# Patient Record
Sex: MALE | Race: WHITE | NOT HISPANIC OR LATINO | Employment: FULL TIME | URBAN - METROPOLITAN AREA
[De-identification: names, ages, dates, MRNs, and addresses within clinical notes are randomized per-mention and may not be internally consistent; named-entity substitution may affect disease eponyms.]

---
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
## 2025-02-05 ENCOUNTER — OFFICE VISIT (OUTPATIENT)
Dept: FAMILY MEDICINE | Facility: CLINIC | Age: 20
End: 2025-02-05
Payer: COMMERCIAL

## 2025-02-05 VITALS
TEMPERATURE: 98.1 F | HEART RATE: 78 BPM | DIASTOLIC BLOOD PRESSURE: 74 MMHG | BODY MASS INDEX: 21.17 KG/M2 | HEIGHT: 74 IN | SYSTOLIC BLOOD PRESSURE: 102 MMHG | WEIGHT: 165 LBS | OXYGEN SATURATION: 100 %

## 2025-02-05 DIAGNOSIS — F33.1 MAJOR DEPRESSIVE DISORDER, RECURRENT EPISODE, MODERATE (H): Primary | ICD-10-CM

## 2025-02-05 DIAGNOSIS — F41.1 GENERALIZED ANXIETY DISORDER: ICD-10-CM

## 2025-02-05 PROCEDURE — 99214 OFFICE O/P EST MOD 30 MIN: CPT | Performed by: PHYSICIAN ASSISTANT

## 2025-02-05 PROCEDURE — 96127 BRIEF EMOTIONAL/BEHAV ASSMT: CPT | Performed by: PHYSICIAN ASSISTANT

## 2025-02-05 PROCEDURE — G2211 COMPLEX E/M VISIT ADD ON: HCPCS | Performed by: PHYSICIAN ASSISTANT

## 2025-02-05 RX ORDER — ESCITALOPRAM OXALATE 10 MG/1
10 TABLET ORAL DAILY
Qty: 30 TABLET | Refills: 1 | Status: SHIPPED | OUTPATIENT
Start: 2025-02-05

## 2025-02-05 RX ORDER — HYDROXYZINE HYDROCHLORIDE 25 MG/1
25 TABLET, FILM COATED ORAL 3 TIMES DAILY PRN
Qty: 60 TABLET | Refills: 0 | Status: SHIPPED | OUTPATIENT
Start: 2025-02-05

## 2025-02-05 ASSESSMENT — ENCOUNTER SYMPTOMS: NERVOUS/ANXIOUS: 1

## 2025-02-05 ASSESSMENT — ANXIETY QUESTIONNAIRES
1. FEELING NERVOUS, ANXIOUS, OR ON EDGE: MORE THAN HALF THE DAYS
8. IF YOU CHECKED OFF ANY PROBLEMS, HOW DIFFICULT HAVE THESE MADE IT FOR YOU TO DO YOUR WORK, TAKE CARE OF THINGS AT HOME, OR GET ALONG WITH OTHER PEOPLE?: SOMEWHAT DIFFICULT
3. WORRYING TOO MUCH ABOUT DIFFERENT THINGS: MORE THAN HALF THE DAYS
6. BECOMING EASILY ANNOYED OR IRRITABLE: NEARLY EVERY DAY
5. BEING SO RESTLESS THAT IT IS HARD TO SIT STILL: MORE THAN HALF THE DAYS
4. TROUBLE RELAXING: MORE THAN HALF THE DAYS
7. FEELING AFRAID AS IF SOMETHING AWFUL MIGHT HAPPEN: SEVERAL DAYS
GAD7 TOTAL SCORE: 13
7. FEELING AFRAID AS IF SOMETHING AWFUL MIGHT HAPPEN: SEVERAL DAYS
2. NOT BEING ABLE TO STOP OR CONTROL WORRYING: SEVERAL DAYS
GAD7 TOTAL SCORE: 13
IF YOU CHECKED OFF ANY PROBLEMS ON THIS QUESTIONNAIRE, HOW DIFFICULT HAVE THESE PROBLEMS MADE IT FOR YOU TO DO YOUR WORK, TAKE CARE OF THINGS AT HOME, OR GET ALONG WITH OTHER PEOPLE: SOMEWHAT DIFFICULT
GAD7 TOTAL SCORE: 13

## 2025-02-05 ASSESSMENT — PAIN SCALES - GENERAL: PAINLEVEL_OUTOF10: NO PAIN (0)

## 2025-02-05 ASSESSMENT — PATIENT HEALTH QUESTIONNAIRE - PHQ9: SUM OF ALL RESPONSES TO PHQ QUESTIONS 1-9: 14

## 2025-02-05 NOTE — PATIENT INSTRUCTIONS
Start lexapro daily for depression and anxiety.     Use Hydroxyzine as needed for panic attacks and sleep.     Follow-up with me in 4-6 weeks for recheck.

## 2025-02-05 NOTE — PROGRESS NOTES
Assessment & Plan   Major depressive disorder, recurrent episode, moderate (H)  Generalized anxiety disorder  Patient presents for worsening anxiety and depression. Hx of depression, anxiety, ADHD and ODD in the past. He has not received treatment for depression and anxiety in the past. He reports difficulty sleeping, eating, lack of energy and interest, however denies suicidal ideation or self harm. He experienced symptoms of anxiety including palpitations, stomach cramping, and difficulty concentrating. DILAN-7 reveals moderate anxiety. PHQ9 reveals moderate depression. Given the patient's history, I suspect depression is the greater cause of his symptoms today. Offered a referral to behavioral health, which he deferred at this time. Recommended starting escitalopram daily with as needed hydroxyzine for panic attacks and difficulty sleeping. Consider treating ADHD in the future as well, but this was deferred by patients and MOC today. Follow-up in 4-6 weeks for recheck.   - escitalopram (LEXAPRO) 10 MG tablet; Take 1 tablet (10 mg) by mouth daily.  - hydrOXYzine HCl (ATARAX) 25 MG tablet; Take 1 tablet (25 mg) by mouth 3 times daily as needed for anxiety.    Depression Screening Follow Up      2/5/2025     8:34 AM   PHQ   PHQ-9 Total Score 14   Q9: Thoughts of better off dead/self-harm past 2 weeks Not at all         2/5/2025     8:34 AM   Last PHQ-9   1.  Little interest or pleasure in doing things 1   2.  Feeling down, depressed, or hopeless 2   3.  Trouble falling or staying asleep, or sleeping too much 2   4.  Feeling tired or having little energy 3   5.  Poor appetite or overeating 3   6.  Feeling bad about yourself 1   7.  Trouble concentrating 2   8.  Moving slowly or restless 0   Q9: Thoughts of better off dead/self-harm past 2 weeks 0   PHQ-9 Total Score 14   Difficulty at work, home, or with people Somewhat difficult     Follow Up Actions Taken  Crisis resource information provided in After Visit Summary        FUTURE APPOINTMENTS:       - Follow-up E-visit in 4-6 weeks.     Romeo Grimes is a 19 year old, presenting for the following health issues:  Depression and Anxiety        2/5/2025     7:52 AM   Additional Questions   Roomed by Rose Carrasco CMA   Accompanied by Mother     History of Present Illness       Mental Health Follow-up:  Patient presents to follow-up on Depression & Anxiety.Patient's depression since last visit has been:  Medium  The patient is having other symptoms associated with depression.  Patient's anxiety since last visit has been:  Bad  The patient is having other symptoms associated with anxiety.  Any significant life events: relationship concerns, job concerns and financial concerns  Patient is feeling anxious or having panic attacks.  Patient has no concerns about alcohol or drug use.   He is taking medications regularly.     Depression and Anxiety   How are you doing with your depression since your last visit? New sxs   How are you doing with your anxiety since your last visit?  Situational pt would not discuss   Are you having other symptoms that might be associated with depression or anxiety? Yes:  tightness in chest , stomach issues , not eating   Have you had a significant life event? OTHER: Pt would not discuss with roomer     Do you have any concerns with your use of alcohol or other drugs? No    Social History     Tobacco Use    Smoking status: Never    Smokeless tobacco: Never    Tobacco comments:     No exposure at home    Vaping Use    Vaping status: Never Used   Substance Use Topics    Alcohol use: No    Drug use: No         2/5/2025     8:34 AM   PHQ   PHQ-9 Total Score 14   Q9: Thoughts of better off dead/self-harm past 2 weeks Not at all         2/9/2021     8:10 AM 2/5/2025     7:45 AM   DILAN-7 SCORE   Total Score  13 (moderate anxiety)   Total Score 6 13        Patient-reported         2/5/2025     8:34 AM   Last PHQ-9   1.  Little interest or pleasure in doing things  "1   2.  Feeling down, depressed, or hopeless 2   3.  Trouble falling or staying asleep, or sleeping too much 2   4.  Feeling tired or having little energy 3   5.  Poor appetite or overeating 3   6.  Feeling bad about yourself 1   7.  Trouble concentrating 2   8.  Moving slowly or restless 0   Q9: Thoughts of better off dead/self-harm past 2 weeks 0   PHQ-9 Total Score 14   Difficulty at work, home, or with people Somewhat difficult         2/5/2025     7:45 AM   DILAN-7    1. Feeling nervous, anxious, or on edge 2   2. Not being able to stop or control worrying 1   3. Worrying too much about different things 2   4. Trouble relaxing 2   5. Being so restless that it is hard to sit still 2   6. Becoming easily annoyed or irritable 3   7. Feeling afraid, as if something awful might happen 1   DILAN-7 Total Score 13    If you checked any problems, how difficult have they made it for you to do your work, take care of things at home, or get along with other people? Somewhat difficult       Patient-reported       Suicide Assessment Five-step Evaluation and Treatment (SAFE-T)  {  Review of Systems  See HPI       Objective    /74 (BP Location: Right arm, Patient Position: Sitting, Cuff Size: Adult Regular)   Pulse 78   Temp 98.1  F (36.7  C) (Tympanic)   Ht 1.872 m (6' 1.7\")   Wt 74.8 kg (165 lb)   SpO2 100%   BMI 21.36 kg/m    Body mass index is 21.36 kg/m .  Physical Exam   Constitutional: healthy, alert, and no distress  Head: Normocephalic. Atraumatic  Eyes: No conjunctival injection, sclera anicteric  Respiratory: No resp distress.  Musculoskeletal: extremities normal- no gross deformities noted, and normal muscle tone  Neurologic: Gait normal. CN 2-12 grossly intact  Psychiatric: mentation appears normal and affect normal/bright       Physician Attestation   I, Kareem Lundy PA-C, was present with the medical/GUERO student who participated in the service and in the documentation of the note.  I have verified " the history and personally performed the physical exam and medical decision making.  I agree with the assessment and plan of care as documented in the note.      Kareem Lundy PA-C

## 2025-05-29 ENCOUNTER — HOSPITAL ENCOUNTER (EMERGENCY)
Facility: CLINIC | Age: 20
Discharge: HOME | End: 2025-05-29
Payer: COMMERCIAL

## 2025-05-29 VITALS
DIASTOLIC BLOOD PRESSURE: 61 MMHG | TEMPERATURE: 100.6 F | HEART RATE: 78 BPM | SYSTOLIC BLOOD PRESSURE: 113 MMHG | BODY MASS INDEX: 21.67 KG/M2 | WEIGHT: 160 LBS | HEIGHT: 72 IN | RESPIRATION RATE: 18 BRPM | OXYGEN SATURATION: 97 %

## 2025-05-29 DIAGNOSIS — J02.9 PHARYNGITIS: ICD-10-CM

## 2025-05-29 DIAGNOSIS — R53.83 FATIGUE: ICD-10-CM

## 2025-05-29 DIAGNOSIS — R50.9 FEVER: ICD-10-CM

## 2025-05-29 DIAGNOSIS — M79.10 MYALGIA: ICD-10-CM

## 2025-05-29 LAB
FLUAV RNA SPEC QL NAA+PROBE: NEGATIVE
FLUBV RNA RESP QL NAA+PROBE: NEGATIVE
MONOCYTES NFR BLD AUTO: NEGATIVE %
RSV RNA SPEC NAA+PROBE: NEGATIVE
S PYO DNA THROAT QL NAA+PROBE: NOT DETECTED
SARS-COV-2 RNA RESP QL NAA+PROBE: NEGATIVE

## 2025-05-29 PROCEDURE — 99284 EMERGENCY DEPT VISIT MOD MDM: CPT

## 2025-05-29 PROCEDURE — 87651 STREP A DNA AMP PROBE: CPT

## 2025-05-29 PROCEDURE — 87637 SARSCOV2&INF A&B&RSV AMP PRB: CPT

## 2025-05-29 PROCEDURE — 86308 HETEROPHILE ANTIBODY SCREEN: CPT

## 2025-05-29 PROCEDURE — 250N000009 HC RX 250

## 2025-05-29 PROCEDURE — 250N000013 HC RX MED GY IP 250 OP 250 PS 637: Performed by: EMERGENCY MEDICINE

## 2025-05-29 PROCEDURE — 36415 COLL VENOUS BLD VENIPUNCTURE: CPT

## 2025-05-29 PROCEDURE — 250N000011 HC RX IP 250 OP 636: Performed by: EMERGENCY MEDICINE

## 2025-05-29 PROCEDURE — 99283 EMERGENCY DEPT VISIT LOW MDM: CPT

## 2025-05-29 RX ORDER — ONDANSETRON 4 MG/1
4 TABLET, ORALLY DISINTEGRATING ORAL ONCE
Status: COMPLETED | OUTPATIENT
Start: 2025-05-29 | End: 2025-05-29

## 2025-05-29 RX ORDER — ACETAMINOPHEN 500 MG
1000 TABLET ORAL ONCE
Status: COMPLETED | OUTPATIENT
Start: 2025-05-29 | End: 2025-05-29

## 2025-05-29 RX ORDER — DEXAMETHASONE SODIUM PHOSPHATE 4 MG/ML
10 VIAL (ML) INJECTION ONCE
Status: COMPLETED | OUTPATIENT
Start: 2025-05-29 | End: 2025-05-29

## 2025-05-29 RX ORDER — ONDANSETRON 4 MG/1
4 TABLET, ORALLY DISINTEGRATING ORAL EVERY 8 HOURS PRN
Qty: 10 TABLET | Refills: 0 | Status: SHIPPED | OUTPATIENT
Start: 2025-05-29

## 2025-05-29 RX ADMIN — DEXAMETHASONE SODIUM PHOSPHATE 10 MG: 4 INJECTION, SOLUTION INTRAMUSCULAR; INTRAVENOUS at 12:10

## 2025-05-29 RX ADMIN — ACETAMINOPHEN 1000 MG: 500 TABLET, FILM COATED ORAL at 10:11

## 2025-05-29 RX ADMIN — ONDANSETRON 4 MG: 4 TABLET, ORALLY DISINTEGRATING ORAL at 10:11

## 2025-05-29 ASSESSMENT — COLUMBIA-SUICIDE SEVERITY RATING SCALE - C-SSRS
2. HAVE YOU ACTUALLY HAD ANY THOUGHTS OF KILLING YOURSELF IN THE PAST MONTH?: NO
6. HAVE YOU EVER DONE ANYTHING, STARTED TO DO ANYTHING, OR PREPARED TO DO ANYTHING TO END YOUR LIFE?: NO
1. IN THE PAST MONTH, HAVE YOU WISHED YOU WERE DEAD OR WISHED YOU COULD GO TO SLEEP AND NOT WAKE UP?: NO

## 2025-05-29 ASSESSMENT — ACTIVITIES OF DAILY LIVING (ADL)
ADLS_ACUITY_SCORE: 41

## 2025-05-29 NOTE — ED PROVIDER NOTES
History     Chief Complaint   Patient presents with    Flu Symptoms       Cornelio Desouza is a 19 year old male with significant pmhx of ADHD, oppositional defiant disorder, anxiety who presents for evaluation of flu-like symptoms. Patient developed n/v, body aches, fever, and sore throat on Tuesday afternoon, and symptoms have persisted.  His fever started on Tuesday night and has been as high as 103.  He has been taking DayQuil, NyQuil, and ibuprofen.  No nausea or vomiting since taking ibuprofen this morning at 830.  He denies congestion, runny nose, neck stiffness/pain, ear pain, difficulty swallowing, difficulty breathing, cough, urinary or stool changes    Allergies:  Allergies   Allergen Reactions    Bees        Problem List:    Patient Active Problem List    Diagnosis Date Noted    Anxiety 02/16/2021     Priority: Medium    Toxic effect of venom of bees 02/09/2021     Priority: Medium    ADHD (attention deficit hyperactivity disorder), combined type 05/06/2015     Priority: Medium    Oppositional defiant disorder 05/06/2015     Priority: Medium        Past Medical History:    No past medical history on file.    Past Surgical History:    No past surgical history on file.    Family History:    No family history on file.    Social History:  Marital Status:  Single [1]  Social History     Tobacco Use    Smoking status: Never    Smokeless tobacco: Never    Tobacco comments:     No exposure at home    Vaping Use    Vaping status: Never Used   Substance Use Topics    Alcohol use: No    Drug use: No        Medications:    ondansetron (ZOFRAN ODT) 4 MG ODT tab  escitalopram (LEXAPRO) 10 MG tablet  hydrOXYzine HCl (ATARAX) 25 MG tablet          Physical Exam   BP: 113/61  Pulse: 78  Temp: 100.6  F (38.1  C)  Resp: 18  Height: 182.9 cm (6')  Weight: 72.6 kg (160 lb)  SpO2: 97 %      Physical Exam  Vitals and nursing note reviewed.   Constitutional:       General: He is not in acute distress.     Appearance: He is  ill-appearing. He is not toxic-appearing or diaphoretic.   HENT:      Head: Normocephalic and atraumatic.      Right Ear: Tympanic membrane normal.      Left Ear: Tympanic membrane normal.      Nose: Nose normal.      Mouth/Throat:      Mouth: Mucous membranes are moist.      Pharynx: Uvula midline. Posterior oropharyngeal erythema present. No uvula swelling.      Tonsils: Tonsillar exudate present. No tonsillar abscesses. 2+ on the right. 2+ on the left.   Eyes:      Extraocular Movements: Extraocular movements intact.      Conjunctiva/sclera: Conjunctivae normal.      Pupils: Pupils are equal, round, and reactive to light.   Cardiovascular:      Rate and Rhythm: Normal rate and regular rhythm.      Heart sounds: Normal heart sounds.   Pulmonary:      Effort: Pulmonary effort is normal.      Breath sounds: Normal breath sounds.   Musculoskeletal:         General: Normal range of motion.      Cervical back: Normal range of motion. No rigidity.   Lymphadenopathy:      Cervical: Cervical adenopathy present.   Skin:     General: Skin is warm.   Neurological:      General: No focal deficit present.      Mental Status: He is alert and oriented to person, place, and time.   Psychiatric:         Mood and Affect: Mood normal.         Behavior: Behavior normal.         ED Course        Procedures                    Results for orders placed or performed during the hospital encounter of 05/29/25 (from the past 24 hours)   Influenza A/B, RSV and SARS-CoV2 PCR (COVID-19) Nose    Specimen: Nose; Swab   Result Value Ref Range    Influenza A PCR Negative Negative    Influenza B PCR Negative Negative    RSV PCR Negative Negative    SARS CoV2 PCR Negative Negative    Narrative    Testing was performed using the Xpert Xpress CoV2/Flu/RSV Assay on the [employer] GeneXpert Instrument. This test should be ordered for the detection of SARS-CoV2, influenza, and RSV viruses in individuals with signs and symptoms of respiratory tract  infection. This test is for in vitro diagnostic use under the [place] FDA for laboratories certified under CLIA to perform high or moderate complexity testing. This test has been [place] FDA cleared. A negative result does not rule out the presence of PCR inhibitors in the specimen or target RNA in concentration below the limit of detection for the assay. If only one viral target is positive but coinfection with multiple targets is suspected, the sample should be re-tested with another FDA cleared, approved, or authorized test, if coninfection would change clinical management. This test was validated by the [facility]. These laboratories are certified under the Clinical Laboratory Improvement Amendments of 1988 (CLIA-88) as qualified to perfom high complexity laboratory testing.   Group A Streptococcus PCR Throat Swab    Specimen: Throat; Swab   Result Value Ref Range    Group A strep by PCR Not Detected Not Detected    Narrative    The Xpert Xpress Strep A test, performed on the [employer], is a rapid, qualitative in vitro diagnostic test for the detection of Streptococcus pyogenes (Group A ß-hemolytic Streptococcus, Strep A) in throat swab specimens from patients with signs and symptoms of pharyngitis. The Xpert Xpress Strep A test can be used as an aid in the diagnosis of Group A Streptococcal pharyngitis. The assay is not intended to monitor treatment for Group A Streptococcus infections. The Xpert Xpress Strep A test utilizes an automated real-time polymerase chain reaction (PCR) to detect Streptococcus pyogenes DNA.   Mono   Result Value Ref Range    Mononucleosis Screen Negative Negative       Medications   acetaminophen (TYLENOL) tablet 1,000 mg (1,000 mg Oral $Given 5/29/25 1011)   ondansetron (ZOFRAN ODT) ODT tab 4 mg (4 mg Oral $Given 5/29/25 1011)   dexAMETHasone (DECADRON) injectable solution used ORALLY 10 mg (10 mg Oral $Given 5/29/25 1210)       Assessments & Plan (with  Medical Decision Making)     I have reviewed the nursing notes.    I have reviewed the findings, diagnosis, plan and need for follow up with the patient.    Medical Decision Making  Cornelio Desouza is a 19 year old male with significant pmhx of ADHD, oppositional defiant disorder, anxiety who presents for evaluation of flu-like symptoms.  Differential diagnoses include flu, COVID, other viral respiratory infection, strep pharyngitis, peritonsillar abscess, gastritis, other acute intra-abdominal pathology.  Vital signs with normotension, but patient is febrile to 100.6 orally.  He is not tachycardic, and he is satting 97% on room air with a regular respiratory rate and effort.    On examination patient is ill-appearing, but he is alert, oriented, and nontoxic.  He is breathing comfortably on room air and speaking in full sentences without difficulty.  Bilateral ear exam negative for signs of AOM or external otitis.  Eyes PERRLA, EOMs intact.  Conjunctiva is white and healthy.  Oropharyngeal exam with 2+ symmetric tonsillar swelling with erythema and exudate.  Uvula is midline and nonswollen.  No physical exam findings concerning for peritonsillar abscess or other deep space soft tissue infection.  Neck with full active range of motion, but there is tender anterior cervical lymphadenopathy.  Lungs are clear to auscultation bilaterally, heart sounds are normal.  Abdomen is soft and nontender.  Low suspicion for acute intra-abdominal pathology as a source for his symptoms given benign abdominal examination, and exam findings consistent with pharyngitis.  COVID, flu, RSV testing was negative.  Group A strep swab was negative.    High suspicion for mononucleosis given significant fatigue, sore throat with exudates and cervical adenopathy, and fever with negative strep testing. I discussed monospot testing with patient and mother and noted it tests falsely negative in up to 25% patients in the first week of illness. They  would prefer to get the test today, and if no improvement in symptoms next week will repeat the test.  This was obtained and was negative.  After discussion of r/b/a patient also requested single dose of decadron for pain and inflammation. Recommend scheduled Tylenol and ibuprofen every 6 hours for symptomatic management, salt water gargles, sore throat lozenges, and increased fluid intake.  We discussed supplementing with Gatorade, Pedialyte, Ensure, or other electrolyte drinks given his decreased appetite, and underscored importance of staying hydrated.  Recommended they schedule a follow-up appointment in clinic next week for reevaluation and further testing if indicated.  They were given strict return precautions to the ER should he have a fever over 100.4 lasting greater than 5 days, severe sore throat with trouble swallowing, voice changes, neck rigidity/pain, uncontrollable vomiting, or if other concerning symptoms develop.  Patient and mom voiced understanding the plan and had no further questions.      Discharge Medication List as of 5/29/2025 12:05 PM        START taking these medications    Details   ondansetron (ZOFRAN ODT) 4 MG ODT tab Take 1 tablet (4 mg) by mouth every 8 hours as needed for nausea., Disp-10 tablet, R-0, E-Prescribe             Final diagnoses:   Pharyngitis   Fever   Myalgia   Fatigue       Alicia Lu PA-C  May 29, 2025  [facility] EMERGENCY DEPT     Alicia Lu PA-C  05/29/25 0638

## 2025-05-29 NOTE — ED TRIAGE NOTES
Pt presents to ED with c/o flu like symptoms since Tuesday afternoon. Pt reports N/V, body aches, fevers, sore throat. Ibuprofen at 0830 this morning.      Triage Assessment (Adult)       Row Name 05/29/25 0948          Triage Assessment    Airway WDL WDL        Respiratory WDL    Respiratory WDL WDL        Skin Circulation/Temperature WDL    Skin Circulation/Temperature WDL X;temperature     Skin Temperature warm        Cardiac WDL    Cardiac WDL WDL        Cognitive/Neuro/Behavioral WDL    Cognitive/Neuro/Behavioral WDL WDL

## 2025-05-29 NOTE — DISCHARGE INSTRUCTIONS
For pain and fever I recommend taking Ibuprofen 600mg and/or Tylenol 500-1000mg every 6 hours as needed. You can alternate them so you are taking something every 3 hours (Ibuprofen 9AM, Tylenol 12PM, Ibuprofen 3PM, Tylenol 6PM, etc). You can also take them both together every 6 hours.    If you have nausea you can take zofran as needed up to three times a day.    Push fluids to prevent dehydration. You can drink water, tea, liquid IV, gatorade, pedialyte, etc. Avoid caffeine or pop.     Return to the ER if you develop severe headache, vision changes, neck stiffness/pain, uncontrollable vomiting, difficulty breathing, altered mental status/confusion, or if other concerning symptoms develop.

## 2025-06-03 ENCOUNTER — OFFICE VISIT (OUTPATIENT)
Dept: FAMILY MEDICINE | Facility: CLINIC | Age: 20
End: 2025-06-03
Payer: COMMERCIAL

## 2025-06-03 VITALS
HEART RATE: 90 BPM | WEIGHT: 162 LBS | SYSTOLIC BLOOD PRESSURE: 122 MMHG | BODY MASS INDEX: 21.94 KG/M2 | TEMPERATURE: 98.1 F | RESPIRATION RATE: 22 BRPM | DIASTOLIC BLOOD PRESSURE: 78 MMHG | OXYGEN SATURATION: 99 % | HEIGHT: 72 IN

## 2025-06-03 DIAGNOSIS — F41.1 GENERALIZED ANXIETY DISORDER: ICD-10-CM

## 2025-06-03 DIAGNOSIS — F33.1 MAJOR DEPRESSIVE DISORDER, RECURRENT EPISODE, MODERATE (H): ICD-10-CM

## 2025-06-03 DIAGNOSIS — R10.13 EPIGASTRIC PAIN: ICD-10-CM

## 2025-06-03 DIAGNOSIS — J02.9 SORE THROAT: Primary | ICD-10-CM

## 2025-06-03 DIAGNOSIS — F91.3 OPPOSITIONAL DEFIANT DISORDER: ICD-10-CM

## 2025-06-03 LAB
ALBUMIN SERPL BCG-MCNC: 4.3 G/DL (ref 3.5–5.2)
ALP SERPL-CCNC: 68 U/L (ref 65–260)
ALT SERPL W P-5'-P-CCNC: 87 U/L (ref 0–50)
AMYLASE SERPL-CCNC: 32 U/L (ref 28–100)
ANION GAP SERPL CALCULATED.3IONS-SCNC: 12 MMOL/L (ref 7–15)
AST SERPL W P-5'-P-CCNC: 60 U/L (ref 0–35)
BASOPHILS # BLD AUTO: 0 10E3/UL (ref 0–0.2)
BASOPHILS NFR BLD AUTO: 0 %
BILIRUB SERPL-MCNC: 0.6 MG/DL
BUN SERPL-MCNC: 13.8 MG/DL (ref 6–20)
CALCIUM SERPL-MCNC: 9.8 MG/DL (ref 8.8–10.4)
CHLORIDE SERPL-SCNC: 101 MMOL/L (ref 98–107)
CREAT SERPL-MCNC: 1.11 MG/DL (ref 0.67–1.17)
EGFRCR SERPLBLD CKD-EPI 2021: >90 ML/MIN/1.73M2
EOSINOPHIL # BLD AUTO: 0 10E3/UL (ref 0–0.7)
EOSINOPHIL NFR BLD AUTO: 0 %
ERYTHROCYTE [DISTWIDTH] IN BLOOD BY AUTOMATED COUNT: 12.8 % (ref 10–15)
GLUCOSE SERPL-MCNC: 119 MG/DL (ref 70–99)
HCO3 SERPL-SCNC: 27 MMOL/L (ref 22–29)
HCT VFR BLD AUTO: 44.3 % (ref 40–53)
HGB BLD-MCNC: 14.8 G/DL (ref 13.3–17.7)
IMM GRANULOCYTES # BLD: 0.1 10E3/UL
IMM GRANULOCYTES NFR BLD: 1 %
LIPASE SERPL-CCNC: 14 U/L (ref 13–60)
LYMPHOCYTES # BLD AUTO: 1.3 10E3/UL (ref 0.8–5.3)
LYMPHOCYTES NFR BLD AUTO: 12 %
MCH RBC QN AUTO: 28.3 PG (ref 26.5–33)
MCHC RBC AUTO-ENTMCNC: 33.4 G/DL (ref 31.5–36.5)
MCV RBC AUTO: 85 FL (ref 78–100)
MONOCYTES # BLD AUTO: 0.6 10E3/UL (ref 0–1.3)
MONOCYTES NFR BLD AUTO: 6 %
MONOCYTES NFR BLD AUTO: NEGATIVE %
NEUTROPHILS # BLD AUTO: 8.5 10E3/UL (ref 1.6–8.3)
NEUTROPHILS NFR BLD AUTO: 80 %
PLATELET # BLD AUTO: 264 10E3/UL (ref 150–450)
POTASSIUM SERPL-SCNC: 4.2 MMOL/L (ref 3.4–5.3)
PROT SERPL-MCNC: 7.8 G/DL (ref 6.4–8.3)
RBC # BLD AUTO: 5.23 10E6/UL (ref 4.4–5.9)
SODIUM SERPL-SCNC: 140 MMOL/L (ref 135–145)
WBC # BLD AUTO: 10.6 10E3/UL (ref 4–11)

## 2025-06-03 PROCEDURE — 3078F DIAST BP <80 MM HG: CPT | Performed by: NURSE PRACTITIONER

## 2025-06-03 PROCEDURE — 82150 ASSAY OF AMYLASE: CPT | Performed by: NURSE PRACTITIONER

## 2025-06-03 PROCEDURE — 85025 COMPLETE CBC W/AUTO DIFF WBC: CPT | Performed by: NURSE PRACTITIONER

## 2025-06-03 PROCEDURE — 99214 OFFICE O/P EST MOD 30 MIN: CPT | Performed by: NURSE PRACTITIONER

## 2025-06-03 PROCEDURE — 86308 HETEROPHILE ANTIBODY SCREEN: CPT | Performed by: NURSE PRACTITIONER

## 2025-06-03 PROCEDURE — 36415 COLL VENOUS BLD VENIPUNCTURE: CPT | Performed by: NURSE PRACTITIONER

## 2025-06-03 PROCEDURE — 83690 ASSAY OF LIPASE: CPT | Performed by: NURSE PRACTITIONER

## 2025-06-03 PROCEDURE — 80053 COMPREHEN METABOLIC PANEL: CPT | Performed by: NURSE PRACTITIONER

## 2025-06-03 PROCEDURE — 3074F SYST BP LT 130 MM HG: CPT | Performed by: NURSE PRACTITIONER

## 2025-06-03 PROCEDURE — 1125F AMNT PAIN NOTED PAIN PRSNT: CPT | Performed by: NURSE PRACTITIONER

## 2025-06-03 RX ORDER — OMEPRAZOLE 20 MG/1
20 CAPSULE, DELAYED RELEASE ORAL DAILY
Qty: 30 CAPSULE | Refills: 0 | Status: SHIPPED | OUTPATIENT
Start: 2025-06-03

## 2025-06-03 ASSESSMENT — ANXIETY QUESTIONNAIRES
7. FEELING AFRAID AS IF SOMETHING AWFUL MIGHT HAPPEN: SEVERAL DAYS
7. FEELING AFRAID AS IF SOMETHING AWFUL MIGHT HAPPEN: SEVERAL DAYS
6. BECOMING EASILY ANNOYED OR IRRITABLE: MORE THAN HALF THE DAYS
5. BEING SO RESTLESS THAT IT IS HARD TO SIT STILL: SEVERAL DAYS
3. WORRYING TOO MUCH ABOUT DIFFERENT THINGS: MORE THAN HALF THE DAYS
4. TROUBLE RELAXING: MORE THAN HALF THE DAYS
8. IF YOU CHECKED OFF ANY PROBLEMS, HOW DIFFICULT HAVE THESE MADE IT FOR YOU TO DO YOUR WORK, TAKE CARE OF THINGS AT HOME, OR GET ALONG WITH OTHER PEOPLE?: SOMEWHAT DIFFICULT
2. NOT BEING ABLE TO STOP OR CONTROL WORRYING: SEVERAL DAYS
GAD7 TOTAL SCORE: 11
GAD7 TOTAL SCORE: 11
1. FEELING NERVOUS, ANXIOUS, OR ON EDGE: MORE THAN HALF THE DAYS
GAD7 TOTAL SCORE: 11
IF YOU CHECKED OFF ANY PROBLEMS ON THIS QUESTIONNAIRE, HOW DIFFICULT HAVE THESE PROBLEMS MADE IT FOR YOU TO DO YOUR WORK, TAKE CARE OF THINGS AT HOME, OR GET ALONG WITH OTHER PEOPLE: SOMEWHAT DIFFICULT

## 2025-06-03 ASSESSMENT — PATIENT HEALTH QUESTIONNAIRE - PHQ9
SUM OF ALL RESPONSES TO PHQ QUESTIONS 1-9: 12
10. IF YOU CHECKED OFF ANY PROBLEMS, HOW DIFFICULT HAVE THESE PROBLEMS MADE IT FOR YOU TO DO YOUR WORK, TAKE CARE OF THINGS AT HOME, OR GET ALONG WITH OTHER PEOPLE: SOMEWHAT DIFFICULT
SUM OF ALL RESPONSES TO PHQ QUESTIONS 1-9: 12

## 2025-06-03 ASSESSMENT — PAIN SCALES - GENERAL: PAINLEVEL_OUTOF10: MODERATE PAIN (6)

## 2025-06-03 NOTE — PATIENT INSTRUCTIONS
Based on our discussion, I have outlined the following instructions for you:      - Get a blood test done to check for symptoms related to your upper respiratory infection.  - Begin taking sertraline by starting with half a tablet for the first 1 to 2 weeks, then increase the dose as advised.  - Keep an eye on your mood and feelings to see if your depression gets worse.  -start omeprazole 20 mg daily for 2-4 weeks  -Recheck in 1-2 months, sooner if needed    Thank you again for your visit, and we look forward to supporting you in your journey to better health.

## 2025-06-03 NOTE — PROGRESS NOTES
Assessment & Plan     Major depressive disorder, recurrent episode, moderate (H)  Not controlled. - PHQ-9 and DILAN -7 scores, indicating elevated symptoms.  - Did not tolerate the Lexapro. Plan to start sertraline, take half a tab for 1 to 2 weeks, then increase. Monitor for worsening depression.  - Risks and side effects: Headache and nausea are common side effects when starting sertraline. Black box warning for worsening depression/thoughts of self harm  -Declined referral for counseling.   -recheck in 1-2 months, sooner if needed.  - sertraline (ZOLOFT) 50 MG tablet; Take 1 tablet (50 mg) by mouth daily.    Oppositional defiant disorder  Per above.   - sertraline (ZOLOFT) 50 MG tablet; Take 1 tablet (50 mg) by mouth daily.    Generalized anxiety disorder  Not controlled per above.   - sertraline (ZOLOFT) 50 MG tablet; Take 1 tablet (50 mg) by mouth daily.    Epigastric pain  Epigastric tenderness on exam, plan to do 4 weeks of omeprazole for symptoms. Symptomatic care and follow up discussed  - omeprazole (PRILOSEC) 20 MG DR capsule; Take 1 capsule (20 mg) by mouth daily.  - Lipase; Future  - Amylase; Future  - Lipase  - Amylase    Sore throat  Labs completed for additional evaluation. Strep testing previously negative.   - Mononucleosis screen; Future  - CBC with platelets and differential; Future  - Comprehensive metabolic panel (BMP + Alb, Alk Phos, ALT, AST, Total. Bili, TP); Future  - Mononucleosis screen  - CBC with platelets and differential  - Comprehensive metabolic panel (BMP + Alb, Alk Phos, ALT, AST, Total. Bili, TP)      MED REC REQUIRED  Post Medication Reconciliation Status: discharge medications reconciled and changed, per note/orders  Depression Screening Follow Up        6/3/2025     8:37 AM   PHQ   PHQ-9 Total Score 12    Q9: Thoughts of better off dead/self-harm past 2 weeks Not at all       Patient-reported     Follow Up Actions Taken  Crisis resource information provided in After Visit  "Summary       Subjective   Cornelio is a 19 year old, presenting for the following health issues:  ER F/U (Was in the ER on 05/29/25 and tested for mono (came back negative), would like to get tested again for mono today. )        6/3/2025     8:42 AM   Additional Questions   Roomed by Missy MURPHY   Accompanied by self         6/3/2025     8:42 AM   Patient Reported Additional Medications   Patient reports taking the following new medications no new meds     History of Present Illness       Mental Health Follow-up:  Patient presents to follow-up on Depression & Anxiety.Patient's depression since last visit has been:  Medium  The patient is not having other symptoms associated with depression.  Patient's anxiety since last visit has been:  Medium  The patient is not having other symptoms associated with anxiety.  Any significant life events: job concerns and financial concerns  Patient is feeling anxious or having panic attacks.  Patient has no concerns about alcohol or drug use.    Headaches:   Since the patient's last clinic visit, headaches are: no change  The patient is getting headaches:  Everyday since i got sick  He is able to do normal daily activities when he has a migraine.  The patient is taking the following rescue/relief medications:  Ibuprofen (Advil, Motrin)   Patient states \"I get some relief\" from the rescue/relief medications.   The patient is taking the following medications to prevent migraines:  No medications to prevent migraines  In the past 4 weeks, the patient has gone to an Urgent Care or Emergency Room 2 times times due to headaches.    Reason for visit:  Follow up on testing for mono and srep    He eats 0-1 servings of fruits and vegetables daily.He consumes 2 sweetened beverage(s) daily.He exercises with enough effort to increase his heart rate 60 or more minutes per day.  He exercises with enough effort to increase his heart rate 6 days per week. He is missing 7 dose(s) of medications per " week.  He is not taking prescribed medications regularly due to side effects.          2/5/2025     8:34 AM 6/3/2025     8:37 AM   PHQ   PHQ-9 Total Score 14 12    Q9: Thoughts of better off dead/self-harm past 2 weeks Not at all Not at all       Patient-reported         2/9/2021     8:10 AM 2/5/2025     7:45 AM 6/3/2025     8:46 AM   DILAN-7 SCORE   Total Score  13 (moderate anxiety) 11 (moderate anxiety)   Total Score 6 13  11        Patient-reported       ED/UC Followup:    Facility:  [facility] Emergency Dept  Date of visit: 05/29/25  Reason for visit: Pharyngitis, fever, myalgia, and fatigue   Current Status: Still feeling run down body aches, headaches, light headed, SOB, still having a sore throat. Tried returning to work yesterday and got sent home due to symptoms.     History of Present Illness-  - Cornelio Desouza, 19 years old male  - Visited the ED 4 or 5 days ago; results were negative  - Experiencing body aches, tiredness, and headaches associated with an upper respiratory infection  - Had a fever this morning, temperature 99.5 F, but low over the weekend  - Stopped taking Lexapro after 2 weeks due to side effects: stomach aches, nausea, lightheadedness, and headaches  - No known exposure to illness  - Sent home from work today and pulled out of work yesterday due to symptoms       Review of Systems  Constitutional, HEENT, cardiovascular, pulmonary, gi and gu systems are negative, except as otherwise noted.      Objective    /78   Pulse 90   Temp 98.1  F (36.7  C) (Tympanic)   Resp 22   Ht 1.829 m (6')   Wt 73.5 kg (162 lb)   SpO2 99%   BMI 21.97 kg/m    Body mass index is 21.97 kg/m .  Physical Exam   GENERAL: alert and no distress  HENT: ear canals and TM's normal, nose and mouth without ulcers or lesions  NECK: no adenopathy, no asymmetry, masses, or scars  RESP: lungs clear to auscultation - no rales, rhonchi or wheezes  CV: regular rate and rhythm, normal S1 S2, no S3 or  S4, no murmur, click or rub, no peripheral edema  ABDOMEN: tenderness epigastric and bowel sounds normal  PSYCH: mentation appears normal, affect normal/bright    Results for orders placed or performed in visit on 06/03/25   CBC with platelets and differential     Status: None (In process)    Narrative    The following orders were created for panel order CBC with platelets and differential.  Procedure                               Abnormality         Status                     ---------                               -----------         ------                     CBC with platelets and ...[[id number]]                      In process                   Please view results for these tests on the individual orders.           Signed Electronically by: KATIE Holm CNP

## 2025-06-05 ENCOUNTER — RESULTS FOLLOW-UP (OUTPATIENT)
Dept: FAMILY MEDICINE | Facility: CLINIC | Age: 20
End: 2025-06-05

## 2025-06-05 DIAGNOSIS — R74.8 ELEVATED LIVER ENZYMES: Primary | ICD-10-CM

## 2025-06-13 ENCOUNTER — HOSPITAL ENCOUNTER (EMERGENCY)
Facility: CLINIC | Age: 20
Discharge: HOME | End: 2025-06-13
Attending: PHYSICIAN ASSISTANT | Admitting: PHYSICIAN ASSISTANT
Payer: COMMERCIAL

## 2025-06-13 ENCOUNTER — APPOINTMENT (OUTPATIENT)
Dept: GENERAL RADIOLOGY | Facility: CLINIC | Age: 20
End: 2025-06-13
Attending: PHYSICIAN ASSISTANT
Payer: COMMERCIAL

## 2025-06-13 VITALS
OXYGEN SATURATION: 96 % | HEART RATE: 95 BPM | SYSTOLIC BLOOD PRESSURE: 103 MMHG | TEMPERATURE: 100 F | DIASTOLIC BLOOD PRESSURE: 69 MMHG | RESPIRATION RATE: 17 BRPM

## 2025-06-13 DIAGNOSIS — J18.9 COMMUNITY ACQUIRED PNEUMONIA OF LEFT LUNG, UNSPECIFIED PART OF LUNG: ICD-10-CM

## 2025-06-13 PROCEDURE — G0463 HOSPITAL OUTPT CLINIC VISIT: HCPCS | Mod: 25 | Performed by: PHYSICIAN ASSISTANT

## 2025-06-13 PROCEDURE — 71046 X-RAY EXAM CHEST 2 VIEWS: CPT

## 2025-06-13 PROCEDURE — 99213 OFFICE O/P EST LOW 20 MIN: CPT | Performed by: PHYSICIAN ASSISTANT

## 2025-06-13 RX ORDER — AZITHROMYCIN 250 MG/1
TABLET, FILM COATED ORAL
Qty: 6 TABLET | Refills: 0 | Status: SHIPPED | OUTPATIENT
Start: 2025-06-13 | End: 2025-06-18

## 2025-06-13 RX ORDER — ALBUTEROL SULFATE 90 UG/1
2 INHALANT RESPIRATORY (INHALATION) EVERY 6 HOURS PRN
Qty: 18 G | Refills: 0 | Status: SHIPPED | OUTPATIENT
Start: 2025-06-13

## 2025-06-13 ASSESSMENT — ACTIVITIES OF DAILY LIVING (ADL): ADLS_ACUITY_SCORE: 41

## 2025-06-13 ASSESSMENT — COLUMBIA-SUICIDE SEVERITY RATING SCALE - C-SSRS
2. HAVE YOU ACTUALLY HAD ANY THOUGHTS OF KILLING YOURSELF IN THE PAST MONTH?: NO
1. IN THE PAST MONTH, HAVE YOU WISHED YOU WERE DEAD OR WISHED YOU COULD GO TO SLEEP AND NOT WAKE UP?: NO
6. HAVE YOU EVER DONE ANYTHING, STARTED TO DO ANYTHING, OR PREPARED TO DO ANYTHING TO END YOUR LIFE?: NO

## 2025-06-14 NOTE — ED PROVIDER NOTES
History   No chief complaint on file.    HPI  Cornelio Desouza is a 19 year old male who presents to urgent care with concern over illness that is present for at least last 2 and half to 3 weeks.  Patient initially developed sore throat, nausea, vomiting, fever, chills, myalgias.  He was evaluated at outside facility had negative strep testing.  Subsequently presented to this UC 5/29/2025 had negative strep, COVID, flu, RSV, mono testing.  Had follow-up in the clinic 6/3/25 had repeat mono testing which remain negative however did have elevated LFTs with AST 60, ALT 87.  He states that he thought symptoms were improving felt okay over the weekend on Monday however subsequently had recurrence of all symptoms including sore throat, nasal congestion, cough, headache and fever up to 101.7  He also complains of increasing discomfort in the left side of his neck, left posterior chest/back.  He has vomiting thought to be due to posttussive emesis.  Unclear if he is having nausea at this time.  He denies any diarrhea, abdominal pain.  No known ill contacts.  He has been taking ibuprofen consistently and last dose of antipyretic was 6-8 hours prior to arrival    Allergies:  Allergies   Allergen Reactions    Bees        Problem List:    Patient Active Problem List    Diagnosis Date Noted    Major depressive disorder, recurrent episode, moderate (H) 06/03/2025     Priority: Medium    Generalized anxiety disorder 06/03/2025     Priority: Medium    Anxiety 02/16/2021     Priority: Medium    Toxic effect of venom of bees 02/09/2021     Priority: Medium    ADHD (attention deficit hyperactivity disorder), combined type 05/06/2015     Priority: Medium    Oppositional defiant disorder 05/06/2015     Priority: Medium        Past Medical History:    No past medical history on file.    Past Surgical History:    No past surgical history on file.    Family History:    No family history on file.    Social History:  Marital Status:   Single [1]  Social History     Tobacco Use    Smoking status: Never    Smokeless tobacco: Never    Tobacco comments:     No exposure at home    Vaping Use    Vaping status: Never Used   Substance Use Topics    Alcohol use: No    Drug use: No      Medications:    hydrOXYzine HCl (ATARAX) 25 MG tablet  omeprazole (PRILOSEC) 20 MG DR capsule  ondansetron (ZOFRAN ODT) 4 MG ODT tab  sertraline (ZOLOFT) 50 MG tablet      Review of Systems  CONSTITUTIONAL:POSITIVE  for fever, chills, myalgias  INTEGUMENTARY/SKIN: NEGATIVE for worrisome rashes, moles or lesions  EYES: NEGATIVE for vision changes or irritation  ENT/MOUTH: POSITIVE for sore throat, nasal congestion and NEGATIVE for ear pain  RESP:POSITIVE for cough, shortness of breath NEGATIVE for wheezing   GI: POSITIVE for vomiting (post-tussive?) and NEGATIVE for diarrhea, abdominal pain   Physical Exam   BP: 103/69  Pulse: 95  Temp: 100  F (37.8  C)  Resp: 17  SpO2: 96 %  Physical Exam  GENERAL APPEARANCE: alert, cooperative, non-toxic, ill-appearing   EYES: EOMI,  PERRL, conjunctiva clear  HENT: ear canals and TM's normal.  Clear rhinorrhea, posterior pharynx none-erythematous without exudate   NECK: supple, nontender, no lymphadenopathy  RESP: lungs clear to auscultation - no rales, rhonchi or wheezes  CV: regular rates and rhythm, normal S1 S2, no murmur noted  ABDOMEN:  soft, nontender, no HSM or masses and bowel sounds normal  SKIN: no suspicious lesions or rashes  ED Course        Procedures              Critical Care time:  none     None         Results for orders placed or performed during the hospital encounter of 06/13/25 (from the past 24 hours)   Chest XR,  PA & LAT    Narrative    EXAM: XR CHEST 2 VIEWS  LOCATION: [facility]  DATE: 6/13/2025    INDICATION: cough, fever, rule out pneumonia  COMPARISON: None.      Impression    IMPRESSION: Localized patchy infiltrate in the left mid/lower lung junction laterally worrisome for  pneumonitis. The chest is otherwise negative. No pleural effusions.     Medications - No data to display    Assessments & Plan (with Medical Decision Making)     I have reviewed the nursing notes.  I have reviewed the findings, diagnosis, plan and need for follow up with the patient.       New Prescriptions    No medications on file     Final diagnoses:   Community acquired pneumonia of left lung, unspecified part of lung     19-year-old male presents to urgent care with concern over 2 and half to 3-week history of sore throat, nasal congestion, nausea, vomiting, fevers initial improvement within the last week however recurrence of fever within the last 3 days with associated increasing nasal congestion/rhinorrhea, cough and development of left-sided back pain.  He had borderline temp upon arrival, remainder vital signs are stable.  Physical exam findings significant for pharyngeal erythema, no exudate at this time.  Lungs were generally clear without wheezing rales or rhonchi however given clinical history chest x-ray was obtained and did show patchy infiltrate in the left mid/lower lung junction concerning for pneumonitis per radiology report.  Suspect pneumonia given clinical history.  I have low suspicion for strep at this time and as patient has been placed on antibiotics for alternate diagnoses he agreed to defer repeat testing.  Did discuss potential for mono contributing to throat pain despite prior negative testing and were/benefits of testing again today and patient elected to defer.  He was discharged home stable with prescription for Augmentin, Zithromax to treat pneumonia, albuterol inhaler for symptomatic relief.  Follow-up if no resolution of fever within the next 48 to 72 hours.  Worrisome reasons to return to ER/UC sooner discussed.     Disclaimer: This note consists of symbols derived from keyboarding, dictation, and/or voice recognition software. As a result, there may be errors in the script that  have gone undetected.  Please consider this when interpreting information found in the chart.      6/13/2025   [facility] EMERGENCY DEPT       Charleen Chaves PA-C  06/14/25 0921

## 2025-06-22 ENCOUNTER — HEALTH MAINTENANCE LETTER (OUTPATIENT)
Age: 20
End: 2025-06-22

## 2025-07-03 DIAGNOSIS — R10.13 EPIGASTRIC PAIN: ICD-10-CM

## 2025-07-03 RX ORDER — OMEPRAZOLE 20 MG/1
20 CAPSULE, DELAYED RELEASE ORAL DAILY
Qty: 30 CAPSULE | Refills: 0 | Status: SHIPPED | OUTPATIENT
Start: 2025-07-03